# Patient Record
Sex: MALE | Race: WHITE | NOT HISPANIC OR LATINO | Employment: FULL TIME | URBAN - METROPOLITAN AREA
[De-identification: names, ages, dates, MRNs, and addresses within clinical notes are randomized per-mention and may not be internally consistent; named-entity substitution may affect disease eponyms.]

---

## 2021-07-11 ENCOUNTER — HOSPITAL ENCOUNTER (OUTPATIENT)
Facility: CLINIC | Age: 36
Setting detail: OBSERVATION
Discharge: HOME | End: 2021-07-12
Attending: EMERGENCY MEDICINE | Admitting: INTERNAL MEDICINE
Payer: COMMERCIAL

## 2021-07-11 DIAGNOSIS — I10 ESSENTIAL HYPERTENSION: Primary | ICD-10-CM

## 2021-07-11 DIAGNOSIS — R00.1 SINUS BRADYCARDIA: ICD-10-CM

## 2021-07-11 PROCEDURE — 99285 EMERGENCY DEPT VISIT HI MDM: CPT

## 2021-07-11 ASSESSMENT — MIFFLIN-ST. JEOR: SCORE: 2334.67

## 2021-07-12 ENCOUNTER — HOSPITAL ENCOUNTER (EMERGENCY)
Dept: CT IMAGING | Facility: CLINIC | Age: 36
End: 2021-07-12
Attending: EMERGENCY MEDICINE
Payer: COMMERCIAL

## 2021-07-12 ENCOUNTER — HOSPITAL ENCOUNTER (OUTPATIENT)
Dept: ULTRASOUND IMAGING | Facility: CLINIC | Age: 36
Setting detail: OBSERVATION
End: 2021-07-12
Attending: INTERNAL MEDICINE
Payer: COMMERCIAL

## 2021-07-12 ENCOUNTER — HOSPITAL ENCOUNTER (EMERGENCY)
Dept: RADIOLOGY | Facility: CLINIC | Age: 36
End: 2021-07-12
Attending: EMERGENCY MEDICINE
Payer: COMMERCIAL

## 2021-07-12 VITALS
TEMPERATURE: 98.1 F | OXYGEN SATURATION: 94 % | SYSTOLIC BLOOD PRESSURE: 154 MMHG | HEART RATE: 58 BPM | BODY MASS INDEX: 40.44 KG/M2 | RESPIRATION RATE: 18 BRPM | HEIGHT: 73 IN | DIASTOLIC BLOOD PRESSURE: 78 MMHG | WEIGHT: 305.1 LBS

## 2021-07-12 PROBLEM — R00.1 SINUS BRADYCARDIA: Status: ACTIVE | Noted: 2021-07-12

## 2021-07-12 LAB
ANION GAP SERPL CALCULATED.3IONS-SCNC: 10 MMOL/L (ref 5–18)
ANION GAP SERPL CALCULATED.3IONS-SCNC: 5 MMOL/L (ref 5–18)
BASOPHILS # BLD AUTO: 0 10E3/UL (ref 0–0.2)
BASOPHILS NFR BLD AUTO: 1 %
BUN SERPL-MCNC: 8 MG/DL (ref 8–22)
BUN SERPL-MCNC: 9 MG/DL (ref 8–22)
C REACTIVE PROTEIN LHE: <0.1 MG/DL (ref 0–0.8)
CALCIUM SERPL-MCNC: 8.5 MG/DL (ref 8.5–10.5)
CALCIUM SERPL-MCNC: 9 MG/DL (ref 8.5–10.5)
CHLORIDE BLD-SCNC: 109 MMOL/L (ref 98–107)
CHLORIDE BLD-SCNC: 110 MMOL/L (ref 98–107)
CO2 SERPL-SCNC: 23 MMOL/L (ref 22–31)
CO2 SERPL-SCNC: 27 MMOL/L (ref 22–31)
CREAT SERPL-MCNC: 0.82 MG/DL (ref 0.7–1.3)
CREAT SERPL-MCNC: 0.97 MG/DL (ref 0.7–1.3)
EOSINOPHIL # BLD AUTO: 0.1 10E3/UL (ref 0–0.7)
EOSINOPHIL NFR BLD AUTO: 4 %
ERYTHROCYTE [DISTWIDTH] IN BLOOD BY AUTOMATED COUNT: 13.2 % (ref 10–15)
ERYTHROCYTE [DISTWIDTH] IN BLOOD BY AUTOMATED COUNT: 13.3 % (ref 10–15)
GFR SERPL CREATININE-BSD FRML MDRD: >90 ML/MIN/1.73M2
GFR SERPL CREATININE-BSD FRML MDRD: >90 ML/MIN/1.73M2
GLUCOSE BLD-MCNC: 101 MG/DL (ref 70–125)
GLUCOSE BLD-MCNC: 103 MG/DL (ref 70–125)
HCT VFR BLD AUTO: 38.3 % (ref 40–53)
HCT VFR BLD AUTO: 40 % (ref 40–53)
HGB BLD-MCNC: 12.4 G/DL (ref 13.3–17.7)
HGB BLD-MCNC: 13 G/DL (ref 13.3–17.7)
IMM GRANULOCYTES # BLD: 0 10E3/UL
IMM GRANULOCYTES NFR BLD: 0 %
LYMPHOCYTES # BLD AUTO: 1.3 10E3/UL (ref 0.8–5.3)
LYMPHOCYTES NFR BLD AUTO: 34 %
MAGNESIUM SERPL-MCNC: 1.8 MG/DL (ref 1.8–2.6)
MCH RBC QN AUTO: 26.7 PG (ref 26.5–33)
MCH RBC QN AUTO: 26.8 PG (ref 26.5–33)
MCHC RBC AUTO-ENTMCNC: 32.4 G/DL (ref 31.5–36.5)
MCHC RBC AUTO-ENTMCNC: 32.5 G/DL (ref 31.5–36.5)
MCV RBC AUTO: 82 FL (ref 78–100)
MCV RBC AUTO: 83 FL (ref 78–100)
MONOCYTES # BLD AUTO: 0.3 10E3/UL (ref 0–1.3)
MONOCYTES NFR BLD AUTO: 8 %
NEUTROPHILS # BLD AUTO: 2 10E3/UL (ref 1.6–8.3)
NEUTROPHILS NFR BLD AUTO: 53 %
NRBC # BLD AUTO: 0 10E3/UL
NRBC BLD AUTO-RTO: 0 /100
PLATELET # BLD AUTO: 182 10E3/UL (ref 150–450)
PLATELET # BLD AUTO: 188 10E3/UL (ref 150–450)
POTASSIUM BLD-SCNC: 3.8 MMOL/L (ref 3.5–5)
POTASSIUM BLD-SCNC: 4 MMOL/L (ref 3.5–5)
RBC # BLD AUTO: 4.62 10E6/UL (ref 4.4–5.9)
RBC # BLD AUTO: 4.86 10E6/UL (ref 4.4–5.9)
SARS-COV-2 RNA RESP QL NAA+PROBE: NEGATIVE
SODIUM SERPL-SCNC: 142 MMOL/L (ref 136–145)
SODIUM SERPL-SCNC: 142 MMOL/L (ref 136–145)
TROPONIN I SERPL-MCNC: <0.01 NG/ML (ref 0–0.29)
TSH SERPL DL<=0.005 MIU/L-ACNC: 3.27 UIU/ML (ref 0.3–5)
WBC # BLD AUTO: 3.7 10E3/UL (ref 4–11)
WBC # BLD AUTO: 5.2 10E3/UL (ref 4–11)

## 2021-07-12 PROCEDURE — 250N000011 HC RX IP 250 OP 636: Performed by: EMERGENCY MEDICINE

## 2021-07-12 PROCEDURE — 84484 ASSAY OF TROPONIN QUANT: CPT | Performed by: EMERGENCY MEDICINE

## 2021-07-12 PROCEDURE — 250N000013 HC RX MED GY IP 250 OP 250 PS 637: Performed by: EMERGENCY MEDICINE

## 2021-07-12 PROCEDURE — 87635 SARS-COV-2 COVID-19 AMP PRB: CPT | Performed by: EMERGENCY MEDICINE

## 2021-07-12 PROCEDURE — 250N000013 HC RX MED GY IP 250 OP 250 PS 637: Performed by: INTERNAL MEDICINE

## 2021-07-12 PROCEDURE — 84443 ASSAY THYROID STIM HORMONE: CPT | Performed by: EMERGENCY MEDICINE

## 2021-07-12 PROCEDURE — 36415 COLL VENOUS BLD VENIPUNCTURE: CPT | Performed by: FAMILY MEDICINE

## 2021-07-12 PROCEDURE — 36592 COLLECT BLOOD FROM PICC: CPT | Performed by: EMERGENCY MEDICINE

## 2021-07-12 PROCEDURE — 83735 ASSAY OF MAGNESIUM: CPT | Performed by: EMERGENCY MEDICINE

## 2021-07-12 PROCEDURE — 85027 COMPLETE CBC AUTOMATED: CPT | Mod: 91 | Performed by: EMERGENCY MEDICINE

## 2021-07-12 PROCEDURE — 93005 ELECTROCARDIOGRAM TRACING: CPT | Performed by: EMERGENCY MEDICINE

## 2021-07-12 PROCEDURE — 80048 BASIC METABOLIC PNL TOTAL CA: CPT | Performed by: FAMILY MEDICINE

## 2021-07-12 PROCEDURE — 999N000157 HC STATISTIC RCP TIME EA 10 MIN

## 2021-07-12 PROCEDURE — 86141 C-REACTIVE PROTEIN HS: CPT | Performed by: INTERNAL MEDICINE

## 2021-07-12 PROCEDURE — 94761 N-INVAS EAR/PLS OXIMETRY MLT: CPT

## 2021-07-12 PROCEDURE — C9803 HOPD COVID-19 SPEC COLLECT: HCPCS

## 2021-07-12 PROCEDURE — 80048 BASIC METABOLIC PNL TOTAL CA: CPT | Performed by: EMERGENCY MEDICINE

## 2021-07-12 PROCEDURE — 99220 PR INITIAL OBSERVATION CARE,LEVEL III: CPT | Performed by: INTERNAL MEDICINE

## 2021-07-12 PROCEDURE — 99214 OFFICE O/P EST MOD 30 MIN: CPT | Performed by: INTERNAL MEDICINE

## 2021-07-12 PROCEDURE — 250N000011 HC RX IP 250 OP 636: Performed by: FAMILY MEDICINE

## 2021-07-12 PROCEDURE — 71046 X-RAY EXAM CHEST 2 VIEWS: CPT

## 2021-07-12 PROCEDURE — G0378 HOSPITAL OBSERVATION PER HR: HCPCS

## 2021-07-12 PROCEDURE — 99207 PR APP CREDIT; MD BILLING SHARED VISIT: CPT | Performed by: FAMILY MEDICINE

## 2021-07-12 PROCEDURE — 76536 US EXAM OF HEAD AND NECK: CPT

## 2021-07-12 PROCEDURE — 70496 CT ANGIOGRAPHY HEAD: CPT

## 2021-07-12 PROCEDURE — 96372 THER/PROPH/DIAG INJ SC/IM: CPT | Mod: 59 | Performed by: FAMILY MEDICINE

## 2021-07-12 PROCEDURE — 85025 COMPLETE CBC W/AUTO DIFF WBC: CPT | Performed by: INTERNAL MEDICINE

## 2021-07-12 PROCEDURE — 250N000013 HC RX MED GY IP 250 OP 250 PS 637: Performed by: FAMILY MEDICINE

## 2021-07-12 RX ORDER — ONDANSETRON 4 MG/1
4 TABLET, ORALLY DISINTEGRATING ORAL EVERY 6 HOURS PRN
Status: DISCONTINUED | OUTPATIENT
Start: 2021-07-12 | End: 2021-07-12 | Stop reason: HOSPADM

## 2021-07-12 RX ORDER — ACETAMINOPHEN 500 MG
1000 TABLET ORAL ONCE
Status: COMPLETED | OUTPATIENT
Start: 2021-07-12 | End: 2021-07-12

## 2021-07-12 RX ORDER — LOSARTAN POTASSIUM 50 MG/1
50 TABLET ORAL AT BEDTIME
Status: DISCONTINUED | OUTPATIENT
Start: 2021-07-12 | End: 2021-07-12

## 2021-07-12 RX ORDER — LISINOPRIL AND HYDROCHLOROTHIAZIDE 20; 25 MG/1; MG/1
1 TABLET ORAL DAILY
Qty: 30 TABLET | Refills: 1 | Status: SHIPPED | OUTPATIENT
Start: 2021-07-13

## 2021-07-12 RX ORDER — HYDRALAZINE HYDROCHLORIDE 20 MG/ML
10 INJECTION INTRAMUSCULAR; INTRAVENOUS EVERY 6 HOURS PRN
Status: DISCONTINUED | OUTPATIENT
Start: 2021-07-12 | End: 2021-07-12 | Stop reason: HOSPADM

## 2021-07-12 RX ORDER — ALLOPURINOL 100 MG/1
200 TABLET ORAL DAILY
COMMUNITY

## 2021-07-12 RX ORDER — LORAZEPAM 0.5 MG/1
0.5 TABLET ORAL
Status: DISCONTINUED | OUTPATIENT
Start: 2021-07-12 | End: 2021-07-12 | Stop reason: HOSPADM

## 2021-07-12 RX ORDER — PANTOPRAZOLE SODIUM 20 MG/1
40 TABLET, DELAYED RELEASE ORAL
Status: DISCONTINUED | OUTPATIENT
Start: 2021-07-12 | End: 2021-07-12 | Stop reason: HOSPADM

## 2021-07-12 RX ORDER — LACTOBACILLUS RHAMNOSUS GG 10B CELL
1 CAPSULE ORAL DAILY
COMMUNITY

## 2021-07-12 RX ORDER — ONDANSETRON 2 MG/ML
4 INJECTION INTRAMUSCULAR; INTRAVENOUS EVERY 6 HOURS PRN
Status: DISCONTINUED | OUTPATIENT
Start: 2021-07-12 | End: 2021-07-12 | Stop reason: HOSPADM

## 2021-07-12 RX ORDER — NITROGLYCERIN 0.4 MG/1
0.4 TABLET SUBLINGUAL EVERY 5 MIN PRN
Status: DISCONTINUED | OUTPATIENT
Start: 2021-07-12 | End: 2021-07-12 | Stop reason: HOSPADM

## 2021-07-12 RX ORDER — ALUMINA, MAGNESIA, AND SIMETHICONE 2400; 2400; 240 MG/30ML; MG/30ML; MG/30ML
30 SUSPENSION ORAL EVERY 4 HOURS PRN
Status: DISCONTINUED | OUTPATIENT
Start: 2021-07-12 | End: 2021-07-12 | Stop reason: HOSPADM

## 2021-07-12 RX ORDER — LOSARTAN POTASSIUM 50 MG/1
50 TABLET ORAL ONCE
Status: COMPLETED | OUTPATIENT
Start: 2021-07-12 | End: 2021-07-12

## 2021-07-12 RX ORDER — ESZOPICLONE 3 MG/1
3 TABLET, FILM COATED ORAL
COMMUNITY

## 2021-07-12 RX ORDER — LANOLIN ALCOHOL/MO/W.PET/CERES
3 CREAM (GRAM) TOPICAL
Status: DISCONTINUED | OUTPATIENT
Start: 2021-07-12 | End: 2021-07-12 | Stop reason: HOSPADM

## 2021-07-12 RX ORDER — ALUMINA, MAGNESIA, AND SIMETHICONE 2400; 2400; 240 MG/30ML; MG/30ML; MG/30ML
30 SUSPENSION ORAL EVERY 4 HOURS PRN
COMMUNITY

## 2021-07-12 RX ORDER — ESZOPICLONE 3 MG/1
3 TABLET, FILM COATED ORAL
Status: DISCONTINUED | OUTPATIENT
Start: 2021-07-12 | End: 2021-07-12 | Stop reason: HOSPADM

## 2021-07-12 RX ORDER — FLUCONAZOLE 200 MG/1
200 TABLET ORAL
COMMUNITY

## 2021-07-12 RX ORDER — ACETAMINOPHEN 500 MG
500-1000 TABLET ORAL EVERY 6 HOURS PRN
COMMUNITY

## 2021-07-12 RX ORDER — COLCHICINE 0.6 MG/1
0.6 TABLET ORAL 2 TIMES DAILY
COMMUNITY

## 2021-07-12 RX ORDER — LOSARTAN POTASSIUM 50 MG/1
50 TABLET ORAL 2 TIMES DAILY
Status: DISCONTINUED | OUTPATIENT
Start: 2021-07-12 | End: 2021-07-12

## 2021-07-12 RX ORDER — IOPAMIDOL 755 MG/ML
100 INJECTION, SOLUTION INTRAVASCULAR ONCE
Status: COMPLETED | OUTPATIENT
Start: 2021-07-12 | End: 2021-07-12

## 2021-07-12 RX ORDER — PANTOPRAZOLE SODIUM 40 MG/1
40 TABLET, DELAYED RELEASE ORAL
COMMUNITY

## 2021-07-12 RX ORDER — COLCHICINE 0.6 MG/1
0.6 TABLET ORAL 2 TIMES DAILY
Status: DISCONTINUED | OUTPATIENT
Start: 2021-07-12 | End: 2021-07-12 | Stop reason: HOSPADM

## 2021-07-12 RX ORDER — LISINOPRIL AND HYDROCHLOROTHIAZIDE 20; 25 MG/1; MG/1
1 TABLET ORAL DAILY
Status: DISCONTINUED | OUTPATIENT
Start: 2021-07-12 | End: 2021-07-12 | Stop reason: HOSPADM

## 2021-07-12 RX ORDER — ALLOPURINOL 100 MG/1
200 TABLET ORAL DAILY
Status: DISCONTINUED | OUTPATIENT
Start: 2021-07-12 | End: 2021-07-12 | Stop reason: HOSPADM

## 2021-07-12 RX ORDER — LOSARTAN POTASSIUM 50 MG/1
50 TABLET ORAL AT BEDTIME
Status: ON HOLD | COMMUNITY
End: 2021-07-12

## 2021-07-12 RX ADMIN — LOSARTAN POTASSIUM 50 MG: 50 TABLET, FILM COATED ORAL at 10:16

## 2021-07-12 RX ADMIN — IOPAMIDOL 100 ML: 755 INJECTION, SOLUTION INTRAVENOUS at 01:08

## 2021-07-12 RX ADMIN — PANTOPRAZOLE SODIUM 40 MG: 20 TABLET, DELAYED RELEASE ORAL at 10:16

## 2021-07-12 RX ADMIN — Medication 3 MG: at 03:43

## 2021-07-12 RX ADMIN — ENOXAPARIN SODIUM 40 MG: 100 INJECTION SUBCUTANEOUS at 13:27

## 2021-07-12 RX ADMIN — LISINOPRIL AND HYDROCHLOROTHIAZIDE 1 TABLET: 25; 20 TABLET ORAL at 13:26

## 2021-07-12 RX ADMIN — ACETAMINOPHEN 1000 MG: 500 TABLET, FILM COATED ORAL at 00:10

## 2021-07-12 RX ADMIN — PANTOPRAZOLE SODIUM 40 MG: 20 TABLET, DELAYED RELEASE ORAL at 16:20

## 2021-07-12 RX ADMIN — COLCHICINE 0.6 MG: 0.6 TABLET ORAL at 11:24

## 2021-07-12 RX ADMIN — LOSARTAN POTASSIUM 50 MG: 50 TABLET, FILM COATED ORAL at 03:43

## 2021-07-12 ASSESSMENT — ENCOUNTER SYMPTOMS
BACK PAIN: 0
COUGH: 0
LIGHT-HEADEDNESS: 0
DIARRHEA: 0
NECK PAIN: 0
DYSURIA: 0
HEADACHES: 1
PALPITATIONS: 1
VOMITING: 0
ABDOMINAL PAIN: 0
FEVER: 0
NAUSEA: 0
TROUBLE SWALLOWING: 0
WEAKNESS: 0
CONFUSION: 0
NUMBNESS: 0

## 2021-07-12 ASSESSMENT — MIFFLIN-ST. JEOR: SCORE: 2357.8

## 2021-07-12 NOTE — DISCHARGE SUMMARY
[facility] MEDICINE  DISCHARGE SUMMARY     Primary Care Physician: Onur Salinas  Admission Date: 7/11/2021   Discharge Provider: Radha Moses MD Discharge Date: 7/12/2021   Diet:   Cardiac, low salt, low fat   Code Status: Full Code   Activity: DCACTIVITY: Activity as tolerated        Condition at Discharge: Good     REASON FOR PRESENTATION(See Admission Note for Details)   History discomfort, noticed bradycardia at home    PRINCIPAL & ACTIVE DISCHARGE DIAGNOSES     Essential hypertension, poorly controlled  History of PVCs, status post ablation in 2/2021  Asymptomatic bradycardia  GERD  Chronic gout  Morbid obesity with BMI 40.25 kg/m   Obstructive sleep apnea      PENDING LABS     Unresulted Labs Ordered in the Past 30 Days of this Admission     No orders found for last 31 day(s).          PROCEDURES ( this hospitalization only)      None    RECOMMENDATIONS TO OUTPATIENT PROVIDER FOR F/U VISIT     Follow-up Appointments     Follow-up and recommended labs and tests       Follow up with pmd in 1 week  Follow up with cardiology in 2 week  Out patient holter monitoring x 48hours--already scheduled               DISPOSITION     Home    SUMMARY OF HOSPITAL COURSE:      Mr.Christopher RITESH Davies is a 36 year old male with a past medical history of hypertension, obesity, obstructive sleep apnea, compliant with CPAP use, history of PVCs, status post ablation in 2/2021, gout, came with episodes of bradycardia associated with chest discomfort.  He noticed his heart rate goes down to upper 30s and lower 40s.  He was evaluated by [facility].  He was scheduled to have 2-day Holter monitoring.  He has mild sinus bradycardia on telemetry in this admission.  No profound bradycardia, pauses or evidence of advanced conduction disease.  Reports some slower heart rate on his personal home device.  Has atypical chest discomfort.  He is physically active.  Recent stress echocardiogram  was essentially negative.  Evaluated by cardiology.  No further cardiac work-up up required at this time.  He was on losartan.  Patient prefers to go back to lisinopril hydrochlorothiazide, which is restarted.  Blood pressure was much stable prior to discharge.  Will follow-up with [place] [facility]ology outpatient. Continue modification of lifestyle for weight loss.  He is compliant with CPAP use.  Gout is stable with allopurinol.  Hydrochlorothiazide restarted again can cause flareup of gout again.    Discharge Medications with Med changes:     Current Discharge Medication List      START taking these medications    Details   lisinopril-hydrochlorothiazide (ZESTORETIC) 20-25 MG tablet Take 1 tablet by mouth daily  Qty: 30 tablet, Refills: 1    Associated Diagnoses: Essential hypertension         CONTINUE these medications which have NOT CHANGED    Details   acetaminophen (TYLENOL) 500 MG tablet Take 500-1,000 mg by mouth every 6 hours as needed for mild pain      allopurinol (ZYLOPRIM) 100 MG tablet Take 200 mg by mouth daily      alum & mag hydroxide-simethicone (MAALOX  ES) 400-400-40 MG/5ML SUSP suspension Take 30 mLs by mouth every 4 hours as needed for indigestion      colchicine (COLCYRS) 0.6 MG tablet Take 0.6 mg by mouth 2 times daily      eszopiclone (LUNESTA) 3 MG tablet Take 3 mg by mouth nightly as needed for sleep      fluconazole (DIFLUCAN) 200 MG tablet Take 200 mg by mouth every 7 days      lactobacillus rhamnosus, GG, (CULTURELL) capsule Take 1 capsule by mouth daily      pantoprazole (PROTONIX) 40 MG EC tablet Take 40 mg by mouth 2 times daily (before meals)         STOP taking these medications       LORazepam (ATIVAN) 1 MG tablet Comments:   Reason for Stopping:         losartan (COZAAR) 50 MG tablet Comments:   Reason for Stopping:                     Rationale for medication changes:      Losartan discontinued, lisinopril hydrochlorothiazide started        Consults     CARDIOLOGY IP  CONSULT    Immunizations given this encounter     Most Recent Immunizations   Administered Date(s) Administered     COVID-19,PF,Moderna 05/06/2021           Anticoagulation Information      N/A      SIGNIFICANT IMAGING FINDINGS     Results for orders placed or performed during the hospital encounter of 07/11/21   CTA Head Neck with Contrast    Impression    IMPRESSION:   HEAD CT:  1.  No acute intracranial process.    HEAD CTA:   1.  No stenosis/occlusion, aneurysm, or high flow vascular malformation.    NECK CTA:  1.  No measurable stenosis or dissection.   Chest XR,  PA & LAT    Impression    IMPRESSION: Heart size and pulmonary vascularity normal. The lungs are clear.       SIGNIFICANT LABORATORY FINDINGS     hgb 13  Troponins are within normal limits    Discharge Orders        Reason for your hospital stay    Chest discomfort     Follow-up and recommended labs and tests     Follow up with pmd in 1 week  Follow up with cardiology in 2 week  Out patient holter monitoring x 48hours--already scheduled     Activity    Your activity upon discharge: activity as tolerated     Diet    Follow this diet upon discharge: cardiac, low salt, low fat       Examination   Physical Exam   Temp:  [97.6  F (36.4  C)-98.2  F (36.8  C)] 98.1  F (36.7  C)  Pulse:  [50-69] 58  Resp:  [17-24] 18  BP: (140-193)/() 154/78  SpO2:  [92 %-98 %] 94 %  Wt Readings from Last 1 Encounters:   07/12/21 138.4 kg (305 lb 1.6 oz)     GENERAL:  Alert, appears comfortable, in no acute distress, appears stated age, obese   HEAD:  Normocephalic, without obvious abnormality, atraumatic   EYES:  PERRL, conjunctiva  clear,   EOM's intact   NOSE: Nares normal,  mucosa normal, no drainage   THROAT: Lips, mucosa,  gums normal, mouth moist   NECK: Supple, symmetrical, trachea midline   BACK:   Symmetric, no curvature, ROM normal   LUNGS:   Clear to auscultation bilaterally, no rales, rhonchi, or wheezing, symmetric chest rise on inhalation, respirations  unlabored   CHEST WALL:  No tenderness or deformity   HEART:  Regular rate and rhythm, S1 and S2 normal, no murmur, rub, or gallop    ABDOMEN:   Soft, non-tender, bowel sounds active , no masses, no organomegaly, no rebound or guarding   EXTREMITIES: no  edema    SKIN: no rashes   NEURO: Alert, oriented x3, moves all four extremities freely   PSYCH: Cooperative, behavior is appropriate            Please see EMR for more detailed significant labs, imaging, consultant notes etc.    I, Radha Moses MD, personally saw the patient today and spent greater than 30 minutes discharging this patient.    Radha Moses MD  [facility]    CC:Onur Salinas

## 2021-07-12 NOTE — H&P
[facility] MEDICINE ADMISSION HISTORY AND PHYSICAL     Assessment & Plan      1. Chest pain and bradycardia - He is CP free at this point. Had recent stress test and it was OK he said. Despite this, he was having intermittent chest pain. He is not on BB and therefore, its not drug related. I did tell him that none of his BP meds - hydrochlorothiazide/lisinopril or losartan would affect his HR. I did also inform him that physically active people may have slow HR. However, it concerns him that with slow HR, he was feeling tired/fatigue and was having chest pain. He was not too confident going home with these concerns.    Had prior cardiac ablation for PVCs last Feb 2021 -- RV outflow tract PVC successfully ablated. If he is having concerns for chest pain, consider cardiology referral     2. He is scheduled to have EGD to address his heartburn. He said, that his left sided CP is not GERD related. He has been on PPI/Peptobismol    3. Has SAVANNAH and uses CPAP. He has lost significant weight with his current level of physical activity    4. Has HTN - and he would like to get his losartan tonight. His hydrochlorothiazide was stopped due to gout issues    5. Feels his thyroid is hard/tight, has been having night sweats, has lost weight -- concerns with his thyroid - will get thyroid US - check for mass    6. Had CTA of the head for headache - nonfocal. Normal CTA         VTE prophylaxis: walk   IV fluids: Per order set   Diet:  NPO, regular, cardiac, diabetic diet   GI prophylaxis: Not indicated at this point, re-assess if needed.   Pain, sleep, and vomiting medications: Per order set  Code Status: Full  COVID test result:  negative   COVID vaccination: completed   Barriers to discharge: admitting clinical condition  Discharge Disposition:  May need transfer to SNF or Home health care. Consider referral to care manager/        Care plan was created based on available information  provided, including patient's condition at the time of encounter.   This plan was discussed with patient and/or family members using layman's terms and have agreed to proceed.   At the end of night shift (9PM - 730A), this case will be presented to the AM Hospitalist.    It is recommended to revise care plan if there is change in condition and/or new clinical information that are not available during my encounter.     All or some of home medication/s were not resumed on admission due to safety reasons or contraindications. Dosing and frequency may also have been modified. Please resume/review them during your visit.     70 minutes of total visit duration and greater than 50% was spent in direct evaluation of patient and coordination of care including discussion of diagnostic test results and recommended treatment. .      Cristopher Corona MD, MPH, FACP, [facility]  Internal Medicine - Hospitalist      Chief Complaint Chest pain      HISTORY     - Met the patient while in the ED. E was awake and alert. He came in because of his concerns for HR slowing down to 40s and feeling tired and no energy. He was also concerned about his left sided chest pain. No SOB. No cough or colds. No trauma. He did share his concerns about chest pain and bradycardia with his cardiology/primary and he had stress test which he said was OK. He also had 24 hour holter monitor which did no reveal any significant findings. Early this year, he had ablation for frequent PVCS and seems successful.   - Patient also reported changes in BP medications from hydrochlorothiazide/lisinopril to losartan which he thinks caused his HR to go down further. He has gout and primary stopped his hydrochlorothiazide.  - He also reported some headaches and was up as early as 4AM and has not slept since. He had some night sweats and feeling tight/hard feel on his thyroid. Had some weight loss which he attributed to physical activity through biking. He said, his free T4 and  TSH were OK   -  In the ED, he head CTA because of his headache and this was negative. His trop was negative.   - ROS ---  No dizziness. No weakness.  No palpitations. No abdominal pain. No nausea or vomiting. No urinary symptoms. No bleeding symptoms. No weight loss. Rest of 12 point ROS was reviewed and negative.         Past Medical History         No Known Allergies,   Past Medical History:   . Date     Acute gout involving toe of right foot 9/2/2020     Adjustment disorder with anxiety 2017     Essential hypertension 07/27/2016     Morbid obesity with BMI of 45.0-49.9, adult (HC) 2/17/2017   and   Patient Active Problem List   Diagnosis Date Noted     GERD (gastroesophageal reflux disease) 07/01/2021     NAFLD (nonalcoholic fatty liver disease) 09/02/2020     Dyslipidemia 09/02/2020     Onychomycosis 09/02/2020     Gout 09/02/2020     SAVANNAH on CPAP 09/02/2020     PVC's (premature ventricular contractions) 09/02/2020   Ablation 2/2021      Prediabetes 12/07/2017     Obesity, Class II, BMI 35-39.9 02/17/2017     Essential hypertension 07/27/2016         Surgical History   Ablation       Family History    No family history of CAD    Social History      Reviewed, and he  reports that he has quit smoking. His smoking use included cigars. He has never used smokeless tobacco. He reports current alcohol use.  Social History     Tobacco Use     Smoking status: Former Smoker     Types: Cigars     Smokeless tobacco: Never Used   Substance Use Topics     Alcohol use: Yes         Allergies     No Known Allergies    Prior to Admission Medications      No current facility-administered medications on file prior to encounter.  LORazepam (ATIVAN) 1 MG tablet, [LORAZEPAM (ATIVAN) 1 MG TABLET] 0.5 - 1 tab PO qhs PRN trouble sleeping. Do not drive. Do not mix wih alcohol.          Review of Systems     A 12 point comprehensive review of systems was negative except as noted above in HPI.    PHYSICAL EXAMINATION     Vitals      Temp:   [98  F (36.7  C)] 98  F (36.7  C)  Pulse:  [50-61] 61  Resp:  [17-24] 17  BP: (154-193)/() 155/87  SpO2:  [96 %-97 %] 97 %    Examination     General Appearance:  Alert, cooperative, no distress  Head:    Normocephalic, without obvious abnormality, atraumatic  EENT:  PERRL, conjunctiva/corneas clear, EOM's intact.   Neck:   Supple, symmetrical, trachea midline, no adenopathy; no NVE  Back:  Symmetric, no curvature, no CVA tenderness  Chest/Lungs: Clear to auscultation bilaterally, respirations unlabored, No tenderness or deformity. No abdominal breathing or use of accessory muscles.   Heart:    Regular rate and rhythm, S1 and S2 normal, no murmur, rub   or gallop  Abdomen: Soft, non-tender, bowel sounds active all four quadrants, not peritoneal on palpation. Not distended  Extremities:  Extremities normal, atraumatic, no swelling   Skin:  Skin color, texture, turgor normal, no rashes or lesion  Neurologic:  Awake and alert, No lateralizing or localizing signs           Pertinent Lab     Personally reviewed.  Results for orders placed or performed during the hospital encounter of 07/11/21   CTA Head Neck with Contrast     Status: None    Narrative    EXAM: CTA  HEAD NECK WITH CONTRAST  LOCATION: [facility]  DATE/TIME: 7/12/2021 1:08 AM    INDICATION: Headache. Left arm tingling. Posterior headache.  COMPARISON: None.  CONTRAST: Isovue 370 100ml  TECHNIQUE: Head and neck CT angiogram with IV contrast. Noncontrast head CT followed by axial helical CT images of the head and neck vessels obtained during the arterial phase of intravenous contrast administration. Axial 2D reconstructed images and   multiplanar 3D MIP reconstructed images of the head and neck vessels were performed by the technologist. Dose reduction techniques were used. All stenosis measurements made according to NASCET criteria unless otherwise specified.    FINDINGS:   NONCONTRAST HEAD CT:   INTRACRANIAL CONTENTS: No intracranial  hemorrhage, extraaxial collection, or mass effect.  No CT evidence of acute infarct. Normal parenchymal attenuation. Normal ventricles and sulci.     VISUALIZED ORBITS/SINUSES/MASTOIDS: No intraorbital abnormality. Mild mucosal thickening scattered about the paranasal sinuses. No middle ear or mastoid effusion.    BONES/SOFT TISSUES: No acute abnormality.    HEAD CTA:  ANTERIOR CIRCULATION: No stenosis/occlusion, aneurysm, or high flow vascular malformation. Standard [place] anatomy.    POSTERIOR CIRCULATION: No stenosis/occlusion, aneurysm, or high flow vascular malformation. Balanced vertebral arteries supply a normal basilar artery.     DURAL VENOUS SINUSES: Expected enhancement of the major dural venous sinuses.    NECK CTA:  RIGHT CAROTID: No measurable stenosis or dissection.    LEFT CAROTID: No measurable stenosis or dissection.    VERTEBRAL ARTERIES: No focal stenosis or dissection. Dominant left and smaller right vertebral arteries.    AORTIC ARCH: Classic aortic arch anatomy with no significant stenosis at the origin of the great vessels.    NONVASCULAR STRUCTURES: Unremarkable.      Impression    IMPRESSION:   HEAD CT:  1.  No acute intracranial process.    HEAD CTA:   1.  No stenosis/occlusion, aneurysm, or high flow vascular malformation.    NECK CTA:  1.  No measurable stenosis or dissection.   Chest XR,  PA & LAT     Status: None    Narrative    EXAM: XR CHEST 2 VW  LOCATION: [facility]  DATE/TIME: 7/12/2021 1:02 AM    INDICATION: chest pain  COMPARISON: 06/22/2021      Impression    IMPRESSION: Heart size and pulmonary vascularity normal. The lungs are clear.   CBC (+ platelets, no diff)     Status: Abnormal   Result Value Ref Range    WBC Count 5.2 4.0 - 11.0 10e3/uL    RBC Count 4.62 4.40 - 5.90 10e6/uL    Hemoglobin 12.4 (L) 13.3 - 17.7 g/dL    Hematocrit 38.3 (L) 40.0 - 53.0 %    MCV 83 78 - 100 fL    MCH 26.8 26.5 - 33.0 pg    MCHC 32.4 31.5 - 36.5 g/dL    RDW 13.3 10.0 -  15.0 %    Platelet Count 182 150 - 450 10e3/uL   Basic metabolic panel     Status: Abnormal   Result Value Ref Range    Sodium 142 136 - 145 mmol/L    Potassium 3.8 3.5 - 5.0 mmol/L    Chloride 110 (H) 98 - 107 mmol/L    Carbon Dioxide (CO2) 27 22 - 31 mmol/L    Anion Gap 5 5 - 18 mmol/L    Urea Nitrogen 9 8 - 22 mg/dL    Creatinine 0.97 0.70 - 1.30 mg/dL    Calcium 8.5 8.5 - 10.5 mg/dL    Glucose 103 70 - 125 mg/dL    GFR Estimate >90 >60 mL/min/1.73m2   Troponin I (now)     Status: Normal   Result Value Ref Range    Troponin I <0.01 0.00 - 0.29 ng/mL   Magnesium     Status: Normal   Result Value Ref Range    Magnesium 1.8 1.8 - 2.6 mg/dL   TSH with free T4 reflex     Status: Normal   Result Value Ref Range    TSH 3.27 0.30 - 5.00 uIU/mL   SARS-COV2 (COVID-19) Virus RT-PCR     Status: Normal    Specimen: Nasopharyngeal; Swab   Result Value Ref Range    SARS CoV2 PCR Negative Negative    Narrative    Testing was performed using the guera  SARS-CoV-2 & Influenza A/B Assay on the guera  [employer]  System.  This test should be ordered for the detection of SARS-COV-2 in individuals who meet SARS-CoV-2 clinical and/or epidemiological criteria. Test performance is unknown in asymptomatic patients.  This test is for in vitro diagnostic use under the FDA EUA for laboratories certified under CLIA to perform moderate and/or high complexity testing. This test has not been FDA cleared or approved.  A negative test does not rule out the presence of PCR inhibitors in the specimen or target RNA in concentration below the limit of detection for the assay. The possibility of a false negative should be considered if the patient's recent exposure or clinical presentation suggests COVID-19.  [facility] are certified under the Clinical Laboratory Improvement Amendments of 1988 (CLIA-88) as qualified to perform moderate and/or high complexity laboratory testing.   Asymptomatic COVID-19 Virus (Coronavirus) by PCR  Nasopharyngeal     Status: Normal    Specimen: Nasopharyngeal; Swab    Narrative    The following orders were created for panel order Asymptomatic COVID-19 Virus (Coronavirus) by PCR Nasopharyngeal.  Procedure                               Abnormality         Status                     ---------                               -----------         ------                     SARS-COV2 (COVID-19) Vir...[[id number]]  Normal              Final result                 Please view results for these tests on the individual orders.         Pertinent Radiology     Radiology Results: Chest XR,  PA & LAT    Result Date: 7/12/2021  EXAM: XR CHEST 2 VW LOCATION: [facility] DATE/TIME: 7/12/2021 1:02 AM INDICATION: chest pain COMPARISON: 06/22/2021     IMPRESSION: Heart size and pulmonary vascularity normal. The lungs are clear.

## 2021-07-12 NOTE — PROVIDER NOTIFICATION
07/12/21 0400   CPAP/BiPAP/AVAPS/AVAPS AE Patient Assessment   Interface   (pt has family to bring cpap, declined hospital use)

## 2021-07-12 NOTE — PROGRESS NOTES
Discharge reviewed with pt and wife, pt has no questions at time of discharge. Pt dressed and had all belongings at discharge.

## 2021-07-12 NOTE — ED TRIAGE NOTES
Presents with complaint of low heart rate (has been evaluated for this prior, 39-50s), fatigue, headache, high blood pressures, and generally feeling unwell for the past few days. States he was switched to losartan about 10 days ago and noted starting to feel unwell then. He also endorses slight shortness of breath in the upper chest upon inhale and chest tightness with a deep breath. States that he can feel when his heart rate goes into the 40s because he gets pressure in the left shoulder.

## 2021-07-12 NOTE — ED PROVIDER NOTES
EMERGENCY DEPARTMENT ENCOUNTER      NAME: Lavon Davies  AGE: 36 year old male  YOB: 1985  MRN: [id number]  EVALUATION DATE & TIME: 2021 11:01 PM    PCP: No primary care provider on file.    ED PROVIDER: Geri Aguilar M.D.        Chief Complaint   Patient presents with     Fatigue         FINAL IMPRESSION:    1. Sinus bradycardia            MEDICAL DECISION MAKIN year old male with history of bradycardia and PVCs, who underwent ablation for PVCs back in 2021, who presents emergency department with recurrent bradycardia, left shoulder and chest discomfort, headache, and overall feeling unwell.  Work-up in the ER so far unremarkable.  Mild bradycardia but heart rates most consistently in the 50s and 60s.  Blood pressure stable.  Overall patient uncomfortable with going home given his more persistent and frequent episodes of feeling this way.  Plan at this time is admission to the hospital for chest pain rule out and cardiology consultation.  No indication for emergent intervention with regards to bradycardia as blood pressure looks great and not having persistent significant bradycardia.  Nothing at this time to suggest acute stroke, subarachnoid hemorrhage, or other etiologies.  No concerns for aortic catastrophes or PE.      ED COURSE:  11:24 PM I met with the patient for initial exam.    2:53 AM  Patient was updated on all results.  He is feeling better in general.  He does not feel safe going home.  He has concerns about all of his symptoms.  This is his second ER visit now just this weekend for the same symptoms and concerns for bradycardia and what sounds like possible anginal equivalent.  Plan at this time is admission to the hospital for chest pain rule out.  He is supposed to have an outpatient echo and this may be an option to complete while in the hospital.  Patient would appreciate being able to talk with cardiology as well about his symptoms.    I do not  "think that this represents rib fractures, myocarditis, pericarditis, endocarditis, PE, ruptured AAA, pneumothorax, aortic dissection, bowel obstruction, bowel ischemia, cholecystitis, kidney stone, pyelonephritis, CVA, TIA, SAH, glaucoma, temporal arteritis, sinus thrombosis, vascular dissection, pseudotumor cerebri, meningitis, enchephalitis, hypertensive urgency or emergency, or other such etiologies.      COVID-19 PPE worn during patient evaluation:  Mask: n95 and homemade masks  Eye Protection: none  Gown: none  Hair cover: yes  Face shield: no  Patient wearing a mask: yes    At the conclusion of the encounter I discussed the results of all of the tests and the disposition. Their questions were answered. The patient (and any family present) acknowledged understanding and were agreeable with the care plan.        CONSULTANTS:  none          MEDICATIONS GIVEN IN THE EMERGENCY:  Medications   acetaminophen (TYLENOL) tablet 1,000 mg (1,000 mg Oral Given 7/12/21 0010)         NEW PRESCRIPTIONS STARTED AT TODAY'S ER VISIT  none        CONDITION:  stable        DISPOSITION:  Card tele obs as accepted by Dr. Corona, hospitalist         =================================================================  =================================================================    HPI    Patient information was obtained from: patient     Use of Intrepreter: N/A      Lavon Davies is a 36 year old male with history of hypertension, GERD, bradycardia, and PVC's who presents to the ER with complaints of fatigue.    Per chart review, the patient was seen at [facility] on 7/9 for evaluation of chest pain. For the past three to four weeks patient had been experiencing chest pain that was tied to slow heart rate. At the same time, he was experiencing chronically low heart rates down to the 30s and he noted he was \"afraid something was not right\". He noted the slower the heart rate was the more exacerbated the pain is. He " "stated that the pain was similar to the feeling he had before he had PVC's fixed. Patient also noted tiredness, trouble focusing, mild dry cough, mild abdominal pain, mild nausea, and congestion in the morning. EKG showed sinus bradycardia but no signs of block. Additionally, troponin was negative as were other labs including thyroid labs. Patient discharged with recommendation for out patient echocardiogram and a holter monitor.    Since November, the patient has had intermittent bradycardia and PVC's. In 2/2021, the patient had an ablation done and was feeling great. In 6/2021, the patient had his medications adjusted. His PCP stopped his hydrochlorothizide and he was started on lisinopril. In 7/2021, the patient switched PCP's and they switched his lisinopril to losartin to try and help his gout. Since the medication changes, he has been having more episodes of bradycardia.    Earlier today, the patient was visiting family and felt fine. On the drive home, he had an onset of bradycardia. With the bradycardic episodes, the patient has left shoulder pain, headache, and jaw pain. He has a smart watch and his heart rate was in the 40's. He overall wasn't feeling well which prompted his ED visit. Patient also reports intermittent chest pain with a deep breath that feels like a \"catch\" in his chest. The headache is in the posterior aspect of his head. Patient has a history of migraines but usually they are behind the left eye. Since 06/2021, the patient has had left arm numbness that feels like \"hitting your funny bone\". Denies fever, cough, shortness of breath, abdominal pain, vomiting, diarrhea, and any other complaints.    Of note, patient reports occasional alcohol use with the last time being a week ago. No drug use. Patient used to drink caffeine but is trying to stop. He is now taking 200 mg of caffeine daily and is slowly lowering the dose.    REVIEW OF SYSTEMS  Review of Systems   Constitutional: Negative for " fever.   HENT: Negative for trouble swallowing.    Eyes: Negative for visual disturbance.   Respiratory: Negative for cough.    Cardiovascular: Positive for chest pain and palpitations.   Gastrointestinal: Negative for abdominal pain, diarrhea, nausea and vomiting.   Genitourinary: Negative for dysuria.   Musculoskeletal: Negative for back pain and neck pain.        +left shoulder pain   Skin: Negative for rash.   Allergic/Immunologic: Negative for immunocompromised state.   Neurological: Positive for headaches. Negative for weakness, light-headedness and numbness.   Psychiatric/Behavioral: Negative for confusion.   All other systems reviewed and are negative.        PAST MEDICAL HISTORY:  History reviewed. No pertinent past medical history.      PAST SURGICAL HISTORY:  History reviewed. No pertinent surgical history.      CURRENT MEDICATIONS:    Prior to Admission medications    Medication Sig Start Date End Date Taking? Authorizing Provider   LORazepam (ATIVAN) 1 MG tablet [LORAZEPAM (ATIVAN) 1 MG TABLET] 0.5 - 1 tab PO qhs PRN trouble sleeping. Do not drive. Do not mix wih alcohol. 12/14/17   Geri Aguilar MD         ALLERGIES:  No Known Allergies      FAMILY HISTORY:  History reviewed. No pertinent family history.      SOCIAL HISTORY:  Social History     Social History     Socioeconomic History     Marital status:      Spouse name: Not on file     Number of children: Not on file     Years of education: Not on file     Highest education level: Not on file   Occupational History     Not on file   Tobacco Use     Smoking status: Former Smoker     Types: Cigars     Smokeless tobacco: Never Used   Substance and Sexual Activity     Alcohol use: Yes     Drug use: Not on file     Sexual activity: Not on file   Other Topics Concern     Not on file   Social History Narrative     Not on file     Social Determinants of Health     Financial Resource Strain:      Difficulty of Paying Living Expenses:    Food  "Insecurity:      Worried About Running Out of Food in the Last Year:      Ran Out of Food in the Last Year:    Transportation Needs:      Lack of Transportation (Medical):      Lack of Transportation (Non-Medical):    Physical Activity:      Days of Exercise per Week:      Minutes of Exercise per Session:    Stress:      Feeling of Stress :    Social Connections:      Frequency of Communication with Friends and Family:      Frequency of Social Gatherings with Friends and Family:      Attends [employer] Services:      Active Member of Clubs or Organizations:      Attends Club or Organization Meetings:      Marital Status:    Intimate Partner Violence:      Fear of Current or Ex-Partner:      Emotionally Abused:      Physically Abused:      Sexually Abused:        VITALS:  Patient Vitals for the past 24 hrs:   BP Temp Temp src Pulse Resp SpO2 Height Weight   07/12/21 0230 (!) 147/88 -- -- 52 17 97 % -- --   07/12/21 0200 (!) 155/87 -- -- 61 17 97 % -- --   07/12/21 0045 (!) 159/93 -- -- 50 20 97 % -- --   07/12/21 0002 (!) 154/89 -- -- -- -- -- -- --   07/12/21 0000 (!) 154/89 -- -- 57 24 96 % -- --   07/11/21 2345 (!) 157/88 -- -- 53 20 96 % -- --   07/11/21 2259 (!) 193/117 98  F (36.7  C) Oral 56 22 97 % 1.854 m (6' 1\") 136.1 kg (300 lb)       Wt Readings from Last 3 Encounters:   07/11/21 136.1 kg (300 lb)         PHYSICAL EXAM    Constitutional:  Well developed, Well nourished, NAD, GCS 15  HENT:  Normocephalic, Atraumatic, Bilateral external ears normal, Nose normal. Neck- Normal range of motion, No tenderness, Supple, No stridor.   Eyes:  PERRL, EOMI, Conjunctiva normal, No discharge.  Respiratory:  Normal breath sounds, No respiratory distress, No wheezing, Speaks full sentences easily. No cough.   Cardiovascular:  Normal heart rate, Regular rhythm, No murmurs, No rubs, No gallops.   GI:  + obesity.  Bowel sounds normal, Soft, No tenderness, No masses, No flank tenderness. No rebound or guarding.   : " deferred  Musculoskeletal: 2+ DP pulses. No cyanosis, No clubbing. Good range of motion in all major joints. No major deformities noted.   Integument:  Warm, Dry, No erythema, No rash.  No petechiae.   Neurologic:  Alert & oriented x 3, Normal motor function, Normal sensory function, No focal deficits noted.   Psychiatric:  Affect normal, Judgment normal, Mood normal. Cooperative          LAB:  All pertinent labs reviewed and interpreted.  Recent Results (from the past 24 hour(s))   CBC (+ platelets, no diff)    Collection Time: 07/12/21 12:10 AM   Result Value Ref Range    WBC Count 5.2 4.0 - 11.0 10e3/uL    RBC Count 4.62 4.40 - 5.90 10e6/uL    Hemoglobin 12.4 (L) 13.3 - 17.7 g/dL    Hematocrit 38.3 (L) 40.0 - 53.0 %    MCV 83 78 - 100 fL    MCH 26.8 26.5 - 33.0 pg    MCHC 32.4 31.5 - 36.5 g/dL    RDW 13.3 10.0 - 15.0 %    Platelet Count 182 150 - 450 10e3/uL   Basic metabolic panel    Collection Time: 07/12/21 12:10 AM   Result Value Ref Range    Sodium 142 136 - 145 mmol/L    Potassium 3.8 3.5 - 5.0 mmol/L    Chloride 110 (H) 98 - 107 mmol/L    Carbon Dioxide (CO2) 27 22 - 31 mmol/L    Anion Gap 5 5 - 18 mmol/L    Urea Nitrogen 9 8 - 22 mg/dL    Creatinine 0.97 0.70 - 1.30 mg/dL    Calcium 8.5 8.5 - 10.5 mg/dL    Glucose 103 70 - 125 mg/dL    GFR Estimate >90 >60 mL/min/1.73m2   Troponin I (now)    Collection Time: 07/12/21 12:10 AM   Result Value Ref Range    Troponin I <0.01 0.00 - 0.29 ng/mL   Magnesium    Collection Time: 07/12/21 12:10 AM   Result Value Ref Range    Magnesium 1.8 1.8 - 2.6 mg/dL   TSH with free T4 reflex    Collection Time: 07/12/21 12:10 AM   Result Value Ref Range    TSH 3.27 0.30 - 5.00 uIU/mL   SARS-COV2 (COVID-19) Virus RT-PCR    Collection Time: 07/12/21 12:12 AM    Specimen: Nasopharyngeal; Swab   Result Value Ref Range    SARS CoV2 PCR Negative Negative       No results found for: ABORH        RADIOLOGY:  Reviewed all pertinent imaging. Please see official radiology report.    Chest  XR,  PA & LAT   Final Result   IMPRESSION: Heart size and pulmonary vascularity normal. The lungs are clear.      CTA Head Neck with Contrast   Final Result   IMPRESSION:    HEAD CT:   1.  No acute intracranial process.      HEAD CTA:    1.  No stenosis/occlusion, aneurysm, or high flow vascular malformation.      NECK CTA:   1.  No measurable stenosis or dissection.            EKG:    Performed at: 23:05p    Impression: NS bradycardia at 54 bpm.  Flipped T waves noted in lead aVR.  FL interval 188 ms,  ms,  ms.  Nonspecific ST changes compared to prior EKG from December 14, 2017.      I have independently reviewed and interpreted the EKG(s) documented above.        PROCEDURES:  none        I, Milli Lal, am serving as a scribe to document services personally performed by Dr. Geri Aguilar based on my observation and the provider's statements to me. I, Dr. Geri Aguilar MD attest that Milli Lal is acting in a scribe capacity, has observed my performance of the services and has documented them in accordance with my direction.        Geri Aguilar M.D. FACE  Emergency Medicine and Medical Toxicology  [facility] EMERGENCY ROOM  [address] [id number] [phone]  Dept: [phone]     Geri Aguilar MD  07/12/21 0255

## 2021-07-12 NOTE — CONSULTS
[facility]   [address], [place] [address]   [email or web address]   OFFICE: [phone]        Impression and Plan     1.  Sinus bradycardia.  Patient with mild sinus bradycardia on telemetry this admission.  No profound bradycardia, pauses, or evidence of advanced conduction disease.  Reports some slower heart rates on his personal home device.  Denies significant lightheadedness and the like.  Patient is convinced that when he was converted from lisinopril/hydrochlorothiazide to simply lisinopril and then losartan that this contributed to his slower heart rate.  I discussed with Damon that I did not feel there was a significant correlation.  Nonetheless, he is quite convinced and states he would like to go back to the lisinopril/hydrochlorothiazide formulation even if at increased risk of exacerbation of.  There is no indication for permanent pacemaker placement.  Recommend:    In accordance with patient wishes, will discontinue losartan and resume lisinopril/hydrochlorothiazide formulation.    2.  Chest discomfort.  Symptoms atypical for cardiac etiology.  Specifically, he denies any associated chest discomfort symptoms when riding his bicycle which he now performs quite avidly.  He had a recent stress test performed last month which was unremarkable.  Given atypical nature of symptoms and favorable recent stress test do not feel additional cardiac work-up is required in this regard.    3.  Hypertension.    Change in management as per problem #1 in accordance with patient wishes.    Totally, do not feel additional cardiac work-up is required at this time.  Feel patient can follow-up with Primary Provider for management of hypertension as outpatient.  Will sign off.    History of Present Illness    Gabelarry Davies is a 36 year old male that on his personal advice had noted some declination in heart rate.  Patient states that ever since he was  "converted from lisinopril/hydrochlorothiazide and in general he is not felt as well and feels as though his heart rate was affected by this change.  He denies significant lightheadedness and the like.  In addition, he also reports some retrosternal \"burning\" which at times does radiate toward the left shoulder/arm.  This is sporadic.  It is not associated with activity.  Indeed, patient has been biking more avidly and has had no chest discomfort symptoms with this activity.    Further review of systems is otherwise negative/noncontributory (medical record and 13 point review of systems reviewed as well and pertinent positives noted).    Cardiac Diagnostics   Telemetry (personally reviewed): Sinus rhythm    Stress test June 2021 reported as normal.    Twelve-lead ECG (personally reviewed) 11 June 2021: Sinus rhythm with heart rate of 54 bpm.  Borderline incomplete right bundle branch block.      Medical History  Surgical History   History of PVCs/status post ablation.  Hypertension.  Gout  Obstructive sleep apnea  GERD        Family History/Social History/Risk Factors   Patient does not smoke.  Family history: Patient denies early onset coronary disease in immediate family.      Physical Examination       BP (!) 159/95 (BP Location: Left arm)   Pulse 55   Temp 97.6  F (36.4  C) (Oral)   Resp 18   Ht 1.854 m (6' 1\")   Wt 138.4 kg (305 lb 1.6 oz)   SpO2 92%   BMI 40.25 kg/m          Wt Readings from Last 5 Encounters:   07/12/21 138.4 kg (305 lb 1.6 oz)             Intake/Output Summary (Last 24 hours) at 7/12/2021 1222  Last data filed at 7/12/2021 0755  Gross per 24 hour   Intake --   Output 700 ml   Net -700 ml       The patient is alert and oriented times three. Sclerae are anicteric. Mucosal membranes are moist. Jugular venous pressure is normal. No significant adenopathy/thyromegally appreciated. Lungs are clear with good expansion. On cardiovascular exam, the patient has a regular S1 and S2. Abdomen is " soft and non-tender. Extremities reveal no clubbing, cyanosis, or edema.         Imaging      Chest radiograph 11 July 2021:  Heart size and pulmonary vascularity normal. The lungs are clear.    Lab Results     Lab Results   Component Value Date     07/12/2021    CO2 23 07/12/2021    BUN 8 07/12/2021     Lab Results   Component Value Date    WBC 3.7 07/12/2021    HGB 13.0 07/12/2021    HCT 40.0 07/12/2021    MCV 82 07/12/2021     07/12/2021     No results found for: CHOL, TRIG, HDL, LDLDIRECT  No results found for: INR  No results found for: BNP  Lab Results   Component Value Date    TROPONINI <0.01 07/12/2021     Lab Results   Component Value Date    TSH 3.27 07/12/2021         Current Inpatient Scheduled Medications   Scheduled Meds:    allopurinol  200 mg Oral Daily     colchicine  0.6 mg Oral BID     enoxaparin ANTICOAGULANT  40 mg Subcutaneous Q12H     losartan  50 mg Oral BID     pantoprazole  40 mg Oral BID AC     Continuous Infusions:       Medications Prior to Admission   Prior to Admission medications    Medication Sig Start Date End Date Taking? Authorizing Provider   acetaminophen (TYLENOL) 500 MG tablet Take 500-1,000 mg by mouth every 6 hours as needed for mild pain   Yes Unknown, Entered By History   allopurinol (ZYLOPRIM) 100 MG tablet Take 200 mg by mouth daily   Yes Unknown, Entered By History   alum & mag hydroxide-simethicone (MAALOX  ES) 400-400-40 MG/5ML SUSP suspension Take 30 mLs by mouth every 4 hours as needed for indigestion   Yes Unknown, Entered By History   colchicine (COLCYRS) 0.6 MG tablet Take 0.6 mg by mouth 2 times daily   Yes Unknown, Entered By History   eszopiclone (LUNESTA) 3 MG tablet Take 3 mg by mouth nightly as needed for sleep   Yes Unknown, Entered By History   fluconazole (DIFLUCAN) 200 MG tablet Take 200 mg by mouth every 7 days   Yes Unknown, Entered By History   lactobacillus rhamnosus, GG, (CULTURELL) capsule Take 1 capsule by mouth daily   Yes Unknown,  Entered By History   losartan (COZAAR) 50 MG tablet Take 50 mg by mouth At Bedtime   Yes Unknown, Entered By History   pantoprazole (PROTONIX) 40 MG EC tablet Take 40 mg by mouth 2 times daily (before meals)   Yes Unknown, Entered By History

## 2021-07-12 NOTE — PROGRESS NOTES
[facility] MEDICINE PROGRESS NOTE      Identification/Summary: Lavon Davies is a 36 year old male with a past medical history of hypertension, obesity, obstructive sleep apnea, compliant with CPAP use, history of PVCs, status post ablation in 2/2021, gout, came with episodes of bradycardia associated with chest discomfort.  He noticed his heart rate goes down to upper 30s and lower 40s.  He was evaluated by [facility].  He was scheduled to have 2-day Holter monitoring.    Assessment and Plan:  Asymptomatic bradycardia  His heart rate is in 60s now  Heart rate goes upper 30s to lower 40s at home with chest discomfort  Not on AV blocking medication  Bradycardia likely from sleep apnea  Had previous echocardiogram   Cardiology consult    History of PVCs, status post ablation in 2/2021    Essential hypertension, poorly controlled  Losartan 50 mg twice a day, dose increased  Patient prefers lisinopril hydrochlorothiazide  Hydrochlorothiazide is recently discontinued due to gout  IV Hydralazine 10 mg every 6 hours as needed    GERD  Resume PPI    Chronic gout  Stable with allopurinol 200 mg daily, colchicine 0.6 mg twice a day    Morbid obesity Body mass index is 40.25 kg/m .   Modidification of lifestyle for weight loss    Obstructive sleep apnea  Compliant with CPAP use    Code full  DVT prophylaxis  Subcu Lovenox 40 mg daily  Barrier to discharge  Awaiting for more clinical improvement.  Anticipated discharge in 1 day    Interval History/Subjective:  Resting comfortably.  Heart rate is in 60s.  Patient is concerned for symptomatic bradycardia and uncontrolled blood pressure.  Seen by [facility] in the past.  Wants to see cardiology in the hospital.    Review of systems  Rest of the review of system negative except HPI.  Denies headache, chest pain, breathing difficulty, palpitation, nausea, vomiting, abdominal pain or urinary symptoms.    Physical  Exam/Objective:  Temp:  [97.8  F (36.6  C)-98.2  F (36.8  C)] 98.2  F (36.8  C)  Pulse:  [50-69] 69  Resp:  [17-24] 18  BP: (144-193)/() 160/95  SpO2:  [96 %-98 %] 96 %    Body mass index is 40.25 kg/m .     GENERAL:  Alert, appears comfortable, in no acute distress, appears stated age, obese   HEAD:  Normocephalic, without obvious abnormality, atraumatic   EYES:  PERRL, conjunctiva  clear,   EOM's intact   NOSE: Nares normal,  mucosa normal, no drainage   THROAT: Lips, mucosa,  gums normal, mouth moist   NECK: Supple, symmetrical, trachea midline   BACK:   Symmetric, no curvature, ROM normal   LUNGS:   Clear to auscultation bilaterally, no rales, rhonchi, or wheezing, symmetric chest rise on inhalation, respirations unlabored   CHEST WALL:  No tenderness or deformity   HEART:  Regular rate and rhythm, S1 and S2 normal, no murmur, rub, or gallop    ABDOMEN:   Soft, non-tender, bowel sounds active , no masses, no organomegaly, no rebound or guarding   EXTREMITIES: no  edema    SKIN: no rashes   NEURO: Alert, oriented x3, moves all four extremities freely   PSYCH: Cooperative, behavior is appropriate      Medications:   Personally Reviewed.    allopurinol  200 mg Oral Daily     colchicine  0.6 mg Oral BID     losartan  50 mg Oral BID     pantoprazole  40 mg Oral BID AC       Data reviewed today: I personally reviewed all new medications, labs, imaging/diagnostics reports over the past 24 hours.      Labs:   Ref Range & Units 12:10 AM     Sodium 136 - 145 mmol/L 142     Potassium 3.5 - 5.0 mmol/L 3.8     Chloride 98 - 107 mmol/L 110High      Carbon Dioxide (CO2) 22 - 31 mmol/L 27     Anion Gap 5 - 18 mmol/L 5     Urea Nitrogen 8 - 22 mg/dL 9     Creatinine 0.70 - 1.30 mg/dL 0.97     Calcium 8.5 - 10.5 mg/dL 8.5     Glucose 70 - 125 mg/dL 103     GFR Estimate >60 mL/min/1.73m2 >90      Ref Range & Units 12:10 AM      WBC Count 4.0 - 11.0 10e3/uL 5.2     RBC Count 4.40 - 5.90 10e6/uL 4.62     Hemoglobin 13.3 - 17.7  g/dL 12.4Low      Hematocrit 40.0 - 53.0 % 38.3Low      MCV 78 - 100 fL 83     MCH 26.5 - 33.0 pg 26.8     MCHC 31.5 - 36.5 g/dL 32.4     RDW 10.0 - 15.0 % 13.3     Platelet Count 150 - 450 10e3/uL 182      HEAD CT:  1.  No acute intracranial process.     HEAD CTA:   1.  No stenosis/occlusion, aneurysm, or high flow vascular malformation.     NECK CTA:  1.  No measurable stenosis or dissection.    Radha Moses MD  [facility]  Phone: #[phone]

## 2021-07-12 NOTE — PLAN OF CARE
Problem: Adult Inpatient Plan of Care  Goal: Plan of Care Review  Outcome: Improving  Goal: Optimal Comfort and Wellbeing  Outcome: Improving      Pt rates tolerable chest pain 2/10. SB on tele with HR 40-50s. Pt denies to be lightheaded or dizzy.   Bedtime losartan and melatonin administered. Pt uses CPAP at home. Pt offered hospital CPAP, although declines at this time. Wife to bring CPAP tomorrow if stays.

## 2021-07-12 NOTE — PHARMACY-ADMISSION MEDICATION HISTORY
Pharmacy Note - Admission Medication History    Pertinent Provider Information: patient was recently changed from lisinopril to losartan.  He had a clinic visit 7/11 where they discussed changing losartan from 50 mg to 25 mg to see if that would help with symptoms, but pt has not made that change yet     ______________________________________________________________________    Prior To Admission (PTA) med list completed and updated in EMR.       Prior to Admission Medications   Prescriptions Last Dose Informant Patient Reported? Taking?   acetaminophen (TYLENOL) 500 MG tablet 7/11/2021 at Unknown time  Yes Yes   Sig: Take 500-1,000 mg by mouth every 6 hours as needed for mild pain   allopurinol (ZYLOPRIM) 100 MG tablet 7/11/2021 at Unknown time  Yes Yes   Sig: Take 200 mg by mouth daily   alum & mag hydroxide-simethicone (MAALOX  ES) 400-400-40 MG/5ML SUSP suspension Past Month at Unknown time  Yes Yes   Sig: Take 30 mLs by mouth every 4 hours as needed for indigestion   colchicine (COLCYRS) 0.6 MG tablet 7/11/2021 at Unknown time  Yes Yes   Sig: Take 0.6 mg by mouth 2 times daily   eszopiclone (LUNESTA) 3 MG tablet PRN  Yes Yes   Sig: Take 3 mg by mouth nightly as needed for sleep   fluconazole (DIFLUCAN) 200 MG tablet 7/9/2021  Yes Yes   Sig: Take 200 mg by mouth every 7 days   lactobacillus rhamnosus, GG, (CULTURELL) capsule 7/11/2021 at Unknown time  Yes Yes   Sig: Take 1 capsule by mouth daily   losartan (COZAAR) 50 MG tablet 7/11/2021 at had dose at 343 7/12  Yes Yes   Sig: Take 50 mg by mouth At Bedtime   pantoprazole (PROTONIX) 40 MG EC tablet 7/11/2021 at Unknown time  Yes Yes   Sig: Take 40 mg by mouth 2 times daily (before meals)      Facility-Administered Medications: None       Information source(s): Patient and CareEverywhere/SureScripts  Method of interview communication: in-person    Summary of Changes to PTA Med List  New: meds all new  Discontinued: lorazepam  Changed: none    Patient was asked  about OTC/herbal products specifically.  PTA med list reflects this.    In the past week, patient estimated taking medication this percent of the time:  greater than 90%.    Allergies were reviewed, assessed, and updated with the patient.      Patient does not use any multi-dose medications prior to admission.    The information provided in this note is only as accurate as the sources available at the time of the update(s).    Thank you for the opportunity to participate in the care of this patient.    Ashia Soto [facility]  7/12/2021 8:52 AM

## 2021-07-13 LAB
ATRIAL RATE - MUSE: 54 BPM
DIASTOLIC BLOOD PRESSURE - MUSE: NORMAL MMHG
INTERPRETATION ECG - MUSE: NORMAL
P AXIS - MUSE: 45 DEGREES
PR INTERVAL - MUSE: 188 MS
QRS DURATION - MUSE: 110 MS
QT - MUSE: 462 MS
QTC - MUSE: 438 MS
R AXIS - MUSE: -4 DEGREES
SYSTOLIC BLOOD PRESSURE - MUSE: NORMAL MMHG
T AXIS - MUSE: 40 DEGREES
VENTRICULAR RATE- MUSE: 54 BPM